# Patient Record
Sex: FEMALE | Race: OTHER | Employment: UNEMPLOYED | URBAN - METROPOLITAN AREA
[De-identification: names, ages, dates, MRNs, and addresses within clinical notes are randomized per-mention and may not be internally consistent; named-entity substitution may affect disease eponyms.]

---

## 2020-02-28 LAB — AMB EXT HGBA1C: 5.2 %

## 2020-05-26 LAB — AMB EXT CHLAMYDIA TRACHOMATIS ANTIGEN: NEGATIVE

## 2020-11-30 ENCOUNTER — OFFICE VISIT (OUTPATIENT)
Dept: FAMILY MEDICINE CLINIC | Facility: CLINIC | Age: 30
End: 2020-11-30
Payer: COMMERCIAL

## 2020-11-30 VITALS
TEMPERATURE: 98 F | WEIGHT: 205.81 LBS | HEIGHT: 66 IN | RESPIRATION RATE: 18 BRPM | DIASTOLIC BLOOD PRESSURE: 72 MMHG | SYSTOLIC BLOOD PRESSURE: 116 MMHG | HEART RATE: 96 BPM | OXYGEN SATURATION: 96 % | BODY MASS INDEX: 33.07 KG/M2

## 2020-11-30 DIAGNOSIS — Z13.6 SCREENING, ISCHEMIC HEART DISEASE: ICD-10-CM

## 2020-11-30 DIAGNOSIS — Z00.00 ANNUAL PHYSICAL EXAM: Primary | ICD-10-CM

## 2020-11-30 DIAGNOSIS — Z13.21 SCREENING FOR ENDOCRINE, NUTRITIONAL, METABOLIC AND IMMUNITY DISORDER: ICD-10-CM

## 2020-11-30 DIAGNOSIS — Z23 NEED FOR VACCINATION: ICD-10-CM

## 2020-11-30 DIAGNOSIS — R63.5 WEIGHT GAIN: ICD-10-CM

## 2020-11-30 DIAGNOSIS — Z13.228 SCREENING FOR ENDOCRINE, NUTRITIONAL, METABOLIC AND IMMUNITY DISORDER: ICD-10-CM

## 2020-11-30 DIAGNOSIS — Z13.29 SCREENING FOR ENDOCRINE, NUTRITIONAL, METABOLIC AND IMMUNITY DISORDER: ICD-10-CM

## 2020-11-30 DIAGNOSIS — Z13.0 SCREENING FOR ENDOCRINE, NUTRITIONAL, METABOLIC AND IMMUNITY DISORDER: ICD-10-CM

## 2020-11-30 DIAGNOSIS — F43.21 GRIEVING: ICD-10-CM

## 2020-11-30 PROCEDURE — 80061 LIPID PANEL: CPT | Performed by: FAMILY MEDICINE

## 2020-11-30 PROCEDURE — 83036 HEMOGLOBIN GLYCOSYLATED A1C: CPT | Performed by: FAMILY MEDICINE

## 2020-11-30 PROCEDURE — 3074F SYST BP LT 130 MM HG: CPT | Performed by: FAMILY MEDICINE

## 2020-11-30 PROCEDURE — 90715 TDAP VACCINE 7 YRS/> IM: CPT | Performed by: FAMILY MEDICINE

## 2020-11-30 PROCEDURE — 99385 PREV VISIT NEW AGE 18-39: CPT | Performed by: FAMILY MEDICINE

## 2020-11-30 PROCEDURE — 80050 GENERAL HEALTH PANEL: CPT | Performed by: FAMILY MEDICINE

## 2020-11-30 PROCEDURE — 3078F DIAST BP <80 MM HG: CPT | Performed by: FAMILY MEDICINE

## 2020-11-30 PROCEDURE — 36415 COLL VENOUS BLD VENIPUNCTURE: CPT | Performed by: FAMILY MEDICINE

## 2020-11-30 PROCEDURE — 90471 IMMUNIZATION ADMIN: CPT | Performed by: FAMILY MEDICINE

## 2020-11-30 PROCEDURE — 3008F BODY MASS INDEX DOCD: CPT | Performed by: FAMILY MEDICINE

## 2020-11-30 RX ORDER — FLUTICASONE PROPIONATE 50 MCG
SPRAY, SUSPENSION (ML) NASAL
COMMUNITY
Start: 2020-11-21 | End: 2021-02-15

## 2020-11-30 RX ORDER — CETIRIZINE HYDROCHLORIDE 10 MG/1
10 TABLET ORAL DAILY
COMMUNITY

## 2020-11-30 RX ORDER — ALPRAZOLAM 0.25 MG/1
0.25 TABLET ORAL NIGHTLY PRN
COMMUNITY
End: 2021-01-11 | Stop reason: ALTCHOICE

## 2020-11-30 RX ORDER — AZELASTINE 1 MG/ML
SPRAY, METERED NASAL
COMMUNITY
Start: 2020-11-21 | End: 2021-02-15

## 2020-11-30 NOTE — PROGRESS NOTES
REASON FOR VISIT:    Patricia Mooney is a 26year old female who presents for an Annual Health Assessment./establish care. Grieving loss of 12 week pregnancy 6/2020 -underwent D and C. Conceived naturally.  gained weight, has days not continuous feels zuleika index is 33.22 kg/m².     No results found for: GLUCOSE  Lab Results   Component Value Date    CHOLEST 179 11/30/2020     Lab Results   Component Value Date    HDL 66 (H) 11/30/2020     No results found for: TRIGLY  Lab Results   Component Value Date    LDL 30-65 Pap Smear,3 Years due on 03/04/2019    Chlamydia Screening Screen Annually age<25, if sex active/on OCPs; >24 high risk No results found for: CHLAMYDIA    Colonoscopy Screen Every 10 years There are no preventive care reminders to display for this pa 11/30/2020 5.2    No flowsheet data found. Creat/alb ratio  Annually Creatinine (mg/dL)   Date Value   11/30/2020 0.89        LDL  Annually LDL Cholesterol (mg/dL)   Date Value   11/30/2020 101 (H)    No flowsheet data found.      Dilated Eye exam  Torito Alexander vision  HEENT: denies nasal congestion, sinus pain or ST  LUNGS: denies shortness of breath with exertion  CARDIOVASCULAR: denies chest pain on exertion  GI: denies abdominal pain, denies heartburn  : denies dysuria, vaginal discharge or itching, periods EXTERNAL  - CBC WITH DIFFERENTIAL WITH PLATELET  - COMP METABOLIC PANEL (14)  - LIPID PANEL  - TSH W REFLEX TO FREE T4  BMI 33, goal BMI 25  -Encourage healthy diet of whole food and avoid processed food and sugary drinks and sodas.   Diet should include le indicates understanding of these issues and agrees to the plan. Return in about 1 month (around 12/30/2020) for discuss labs, if needed.     Alcohol cessation couseling performed  Diet counseling perfomed    SUGGESTED VACCINATIONS - Influenza, Pneumococcal

## 2020-11-30 NOTE — PROGRESS NOTES
Patient signed HIPAA medical records authorization form for the Facility identified below to disclose patient health information to Mckinley Marroquin / Provider Name: [facility]  Facility Address: [address] [place] [address]

## 2020-11-30 NOTE — PROGRESS NOTES
Patient came in for draw of ordered fasting labs. Patient drawn out of right AC, x 1 attempt and tolerated well. 1 lt grn and 1 lav tube drawn.

## 2020-11-30 NOTE — PATIENT INSTRUCTIONS
As of October 6th 2014, the Drug Enforcement Agency [facility]) is reclassifying all hydrocodone combination medications from Schedule III to Schedule II. This includes medications such as Norco, Vicodin, Lortab, Zohydro, and Vicoprofen.    What this means for yo Basel    [address]: [phone]  Dr. Ela Griffiths  [facility]- Office: [phone]   Dr. Chantal Cortes. [facility]    Dr. Misti Stewart MD  [phone]    Dr. Mireille Aguirre MD  1 your heart. If you’re thinking about exercise, you’re on the right track. You don’t need to become an athlete, but you do need a certain amount of brisk exercise to help strengthen your heart.  If you have been diagnosed with a heart condition, your doctor doctor if you:  · Have chest pain or feel dizzy or lightheaded  · Feel burning, tightness, pressure, or heaviness in your chest, neck, shoulders, back, or arms  · Have unusual shortness of breath  · Have increased joint or muscle pain  · Have palpitations eating fewer processed foods are two great ways to decrease the amount of salt you consume. · Managing calories. A calorie is a unit of energy.  Your body burns calories for fuel, but if you eat more calories than your body burns, the extras are stored as if you plan to eat two servings, double all the numbers on the label. Prepare food right  A key part of healthy cooking is cutting down on added fat and salt. Look on the internet for lower-fat, lower-sodium recipes.  Also, try these tips:  · Remove fat fr a woman stops having monthly periods. After menopause, the body makes less estrogen (female hormone). This increases bone loss. At this point, treatment may be needed to reduce the risk for fracture.  Exercise and calcium can also help keep your bones stron with calcium sulfate   204 mg/3 oz. Low-fat milk   297 mg/1 cup   Soybeans, fresh, boiled   131 mg/1/2 cup   Collards   179 mg/1/2 cup   Swiss cheese   272 mg/1 oz.    White beans, cooked   81 mg/1/2 cup   English muffin, whole wheat   175 mg/1 muffin   C provider wants to check your vitamin D levels to find out if you have any risks to bone health.  These might be:  · Low calcium  · Soft bones caused by low vitamin D or problems using it (osteomalacia)  · Osteopenia  · Osteoporosis  · Rickets, in children used to draw blood from a vein in your arm or hand. Does this test pose any risks? Having a blood test with a needle carries some risks. These include bleeding, infection, bruising, and feeling lightheaded.  When the needle pricks your arm or hand, you m medicines, such as cortisone, increase bone loss. They also decrease bone growth. Ask your healthcare provider about any side effects of your medicines, and how to prevent them. · Protein-rich or salty foods.  Eaten in large amounts, these foods may deplet 5/1/2018  © 2068-9649 [employer]. [address] [facility], [facility] [place]. All rights reserved. This information is not intended as a substitute for professional medical care. Always follow your healthcare professional's instructions.

## 2020-12-01 NOTE — PROGRESS NOTES
Emilee notified:  Dear Sudheer Leon,  Your labs are normal except your lipid panel is elevated.   Treatment for mild to moderate abnormal cholesterol and lipids is best managed  initially with lifestyle changes, improving diet and increasing physical act

## 2020-12-02 ENCOUNTER — MED REC SCAN ONLY (OUTPATIENT)
Dept: FAMILY MEDICINE CLINIC | Facility: CLINIC | Age: 30
End: 2020-12-02

## 2020-12-07 PROBLEM — Z86.19 HISTORY OF COLD SORES: Status: ACTIVE | Noted: 2020-12-07

## 2020-12-07 RX ORDER — FLUCONAZOLE 100 MG/1
TABLET ORAL
COMMUNITY

## 2021-01-11 ENCOUNTER — TELEPHONE (OUTPATIENT)
Dept: OBGYN CLINIC | Facility: CLINIC | Age: 31
End: 2021-01-11

## 2021-01-11 ENCOUNTER — OFFICE VISIT (OUTPATIENT)
Dept: OBGYN CLINIC | Facility: CLINIC | Age: 31
End: 2021-01-11
Payer: COMMERCIAL

## 2021-01-11 VITALS
RESPIRATION RATE: 16 BRPM | DIASTOLIC BLOOD PRESSURE: 70 MMHG | BODY MASS INDEX: 35.49 KG/M2 | HEART RATE: 94 BPM | SYSTOLIC BLOOD PRESSURE: 120 MMHG | WEIGHT: 213 LBS | HEIGHT: 65 IN

## 2021-01-11 DIAGNOSIS — Z12.4 SCREENING FOR CERVICAL CANCER: ICD-10-CM

## 2021-01-11 DIAGNOSIS — Z31.69 ENCOUNTER FOR PRECONCEPTION CONSULTATION: ICD-10-CM

## 2021-01-11 DIAGNOSIS — F43.21 GRIEVING: ICD-10-CM

## 2021-01-11 DIAGNOSIS — Z13.71 SCREENING FOR GENETIC DISEASE CARRIER STATUS: ICD-10-CM

## 2021-01-11 DIAGNOSIS — Z01.419 WELL WOMAN EXAM WITH ROUTINE GYNECOLOGICAL EXAM: Primary | ICD-10-CM

## 2021-01-11 DIAGNOSIS — F43.9 STRESS: ICD-10-CM

## 2021-01-11 DIAGNOSIS — E66.9 OBESITY (BMI 30-39.9): ICD-10-CM

## 2021-01-11 DIAGNOSIS — Z87.59 HISTORY OF MISCARRIAGE: ICD-10-CM

## 2021-01-11 DIAGNOSIS — N97.9 FEMALE INFERTILITY: ICD-10-CM

## 2021-01-11 PROCEDURE — 3074F SYST BP LT 130 MM HG: CPT | Performed by: OBSTETRICS & GYNECOLOGY

## 2021-01-11 PROCEDURE — 99203 OFFICE O/P NEW LOW 30 MIN: CPT | Performed by: OBSTETRICS & GYNECOLOGY

## 2021-01-11 PROCEDURE — 3008F BODY MASS INDEX DOCD: CPT | Performed by: OBSTETRICS & GYNECOLOGY

## 2021-01-11 PROCEDURE — 3078F DIAST BP <80 MM HG: CPT | Performed by: OBSTETRICS & GYNECOLOGY

## 2021-01-11 PROCEDURE — 99385 PREV VISIT NEW AGE 18-39: CPT | Performed by: OBSTETRICS & GYNECOLOGY

## 2021-01-11 NOTE — H&P
[facility]  Obstetrics and Gynecology   History & Physical  New    Chief complaint: Patient presents with:  Physical: here for annual Physical      Subjective:     HPI: Yani Cortes is a 26year old  with LMP Patient's last menstrual per dysmenorrhea. Is sexually active. No dyspareunia. Contraception: none currently   STDs: no   HPV vaccine - received     Pap & HPV negative 1/29/2019 in [place] - has records scanned into chart.      Patient reports no h/o abnormal pap smear but he file prior to visit.        All:    Bee Venom               ANAPHYLAXIS    PMH:  Past Medical History:   Diagnosis Date   • Allergic rhinitis    • Anxiety 11/2020   • COVID-19 03/2020   • Depression 11/2020   • History of cold sores 12/07/2020   • Guy Monthly or less        Drinks per session: 1 or 2        Binge frequency: Less than monthly      Drug use: Never      Sexual activity: Yes        Partners: Male        Birth control/protection: Condom    Lifestyle      Physical activity        Days per wee Height: 65\"       Body mass index is 35.45 kg/m². Physical Exam:  Physical Exam   Nursing note and vitals reviewed. Constitutional: She is oriented to person, place, and time and obese. She appears well-developed and well-nourished. No distress. RDW-SD      35.1 - 46.3 fL  41.3   Prelim Neutrophil Abs      1.50 - 7.70 x10 (3) uL  4.78   Neutrophils Absolute      1.50 - 7.70 x10(3) uL  4.78   Lymphocytes Absolute      1.00 - 4.00 x10(3) uL  3.12   Monocytes Absolute      0.10 - 1.00 x10(3) uL  0. miscarriage, infertility - sounding mainly male factor. Has had a very difficult year emotionally.      Diagnoses and all orders for this visit:    Well woman exam with routine gynecological exam    Screening for cervical cancer    History of miscarriage

## 2021-01-22 ENCOUNTER — HOSPITAL ENCOUNTER (OUTPATIENT)
Dept: GENERAL RADIOLOGY | Facility: HOSPITAL | Age: 31
Discharge: HOME | End: 2021-01-22
Attending: OBSTETRICS & GYNECOLOGY
Payer: COMMERCIAL

## 2021-01-22 DIAGNOSIS — N97.9 FEMALE INFERTILITY: ICD-10-CM

## 2021-01-22 PROCEDURE — 58340 CATHETER FOR HYSTEROGRAPHY: CPT | Performed by: OBSTETRICS & GYNECOLOGY

## 2021-01-22 PROCEDURE — 74740 X-RAY FEMALE GENITAL TRACT: CPT | Performed by: OBSTETRICS & GYNECOLOGY

## 2021-02-13 ENCOUNTER — PATIENT MESSAGE (OUTPATIENT)
Dept: FAMILY MEDICINE CLINIC | Facility: CLINIC | Age: 31
End: 2021-02-13

## 2021-02-15 RX ORDER — FLUTICASONE PROPIONATE 50 MCG
SPRAY, SUSPENSION (ML) NASAL
Qty: 3 BOTTLE | Refills: 2 | Status: SHIPPED | OUTPATIENT
Start: 2021-02-15 | End: 2021-02-19

## 2021-02-15 RX ORDER — AZELASTINE 1 MG/ML
SPRAY, METERED NASAL
Qty: 3 BOTTLE | Refills: 2 | Status: SHIPPED | OUTPATIENT
Start: 2021-02-15 | End: 2021-02-19

## 2021-02-15 NOTE — TELEPHONE ENCOUNTER
From: Patricia Mooney  To: Jeremiah Mae DO  Sent: 2/13/2021 4:16 PM CST  Subject: Prescription Question    Hello,    Could you please call in my prescriptions for     1. Azelastine HCl 0.1 % Soln and   2. Fluticasone Propionate 50 MCG/ACT Susp    I thought

## 2021-02-15 NOTE — TELEPHONE ENCOUNTER
Pt requesting refill of   Requested Prescriptions     Signed Prescriptions Disp Refills   • Azelastine HCl 0.1 % Nasal Solution 3 Bottle 2     Sig: USE 1 SPRAY each nostril BID.      Authorizing Provider: Su Verduzco     Ordering User: Williams Pérez

## 2021-02-18 ENCOUNTER — TELEPHONE (OUTPATIENT)
Dept: OBGYN CLINIC | Facility: CLINIC | Age: 31
End: 2021-02-18

## 2021-02-18 NOTE — TELEPHONE ENCOUNTER
Pt wants to dispute billing code for dos>01/11/21. per pt it should coded as preventive only!  Pt stats other issues was discuss @ the visit but as a new gyne she does not think she she be charge for it.pt did discuss w/ the billing dept & they will contact

## 2021-02-19 ENCOUNTER — TELEPHONE (OUTPATIENT)
Dept: OBGYN CLINIC | Facility: CLINIC | Age: 31
End: 2021-02-19

## 2021-02-22 RX ORDER — FLUTICASONE PROPIONATE 50 MCG
SPRAY, SUSPENSION (ML) NASAL
Qty: 3 BOTTLE | Refills: 2 | Status: SHIPPED | OUTPATIENT
Start: 2021-02-22

## 2021-02-22 RX ORDER — AZELASTINE 1 MG/ML
SPRAY, METERED NASAL
Qty: 3 BOTTLE | Refills: 2 | Status: SHIPPED | OUTPATIENT
Start: 2021-02-22

## 2021-02-22 NOTE — TELEPHONE ENCOUNTER
Pt requesting refill of   Requested Prescriptions     Pending Prescriptions Disp Refills   • Azelastine HCl 0.1 % Nasal Solution 3 Bottle 2     Sig: USE 1 SPRAY each nostril BID.    • Fluticasone Propionate 50 MCG/ACT Nasal Suspension 3 Bottle 2     Sig: IN

## 2021-02-24 ENCOUNTER — TELEPHONE (OUTPATIENT)
Dept: FAMILY MEDICINE CLINIC | Facility: CLINIC | Age: 31
End: 2021-02-24

## 2021-02-24 DIAGNOSIS — Z20.822 CLOSE EXPOSURE TO COVID-19 VIRUS: Primary | ICD-10-CM

## 2021-02-24 NOTE — TELEPHONE ENCOUNTER
Patient exposed with  yesterday to COVID-19 has no symptoms. Requesting testing. Patient was with  on his video call appointment today. Patient will quarantine at home for the next 2 weeks and contact office if symptoms occur.   Recommend

## 2021-03-02 ENCOUNTER — PATIENT MESSAGE (OUTPATIENT)
Dept: FAMILY MEDICINE CLINIC | Facility: CLINIC | Age: 31
End: 2021-03-02

## 2021-03-03 NOTE — TELEPHONE ENCOUNTER
From: Unknown Golas  To: Jean MezaDO  Sent: 3/2/2021 7:43 PM CST  Subject: Non-Urgent Faisal Wlils,    I have a quick question for you regarding an injury I was wondering if I should be seen for.  In October when we moved he

## 2021-04-07 ENCOUNTER — LABORATORY ENCOUNTER (OUTPATIENT)
Dept: LAB | Age: 31
End: 2021-04-07
Attending: OBSTETRICS & GYNECOLOGY
Payer: COMMERCIAL

## 2021-04-07 DIAGNOSIS — E66.9 OBESITY (BMI 30-39.9): ICD-10-CM

## 2021-04-07 DIAGNOSIS — R63.5 WEIGHT GAIN: ICD-10-CM

## 2021-04-07 DIAGNOSIS — N97.9 FEMALE INFERTILITY: ICD-10-CM

## 2021-04-07 PROCEDURE — 36415 COLL VENOUS BLD VENIPUNCTURE: CPT | Performed by: OBSTETRICS & GYNECOLOGY

## 2021-04-07 PROCEDURE — 83520 IMMUNOASSAY QUANT NOS NONAB: CPT | Performed by: OBSTETRICS & GYNECOLOGY

## 2021-04-07 PROCEDURE — 82306 VITAMIN D 25 HYDROXY: CPT | Performed by: OBSTETRICS & GYNECOLOGY

## 2021-04-07 PROCEDURE — 80050 GENERAL HEALTH PANEL: CPT | Performed by: OBSTETRICS & GYNECOLOGY

## 2021-04-07 PROCEDURE — 80061 LIPID PANEL: CPT | Performed by: OBSTETRICS & GYNECOLOGY

## 2021-04-07 PROCEDURE — 84146 ASSAY OF PROLACTIN: CPT | Performed by: OBSTETRICS & GYNECOLOGY

## 2021-04-07 PROCEDURE — 82627 DEHYDROEPIANDROSTERONE: CPT | Performed by: OBSTETRICS & GYNECOLOGY

## 2021-04-07 PROCEDURE — 84403 ASSAY OF TOTAL TESTOSTERONE: CPT | Performed by: OBSTETRICS & GYNECOLOGY

## 2021-04-07 PROCEDURE — 84144 ASSAY OF PROGESTERONE: CPT | Performed by: OBSTETRICS & GYNECOLOGY

## 2021-04-07 PROCEDURE — 83001 ASSAY OF GONADOTROPIN (FSH): CPT | Performed by: OBSTETRICS & GYNECOLOGY

## 2021-04-07 PROCEDURE — 82670 ASSAY OF TOTAL ESTRADIOL: CPT | Performed by: OBSTETRICS & GYNECOLOGY

## 2021-04-07 PROCEDURE — 83036 HEMOGLOBIN GLYCOSYLATED A1C: CPT | Performed by: OBSTETRICS & GYNECOLOGY

## 2021-04-07 PROCEDURE — 84402 ASSAY OF FREE TESTOSTERONE: CPT | Performed by: OBSTETRICS & GYNECOLOGY

## 2021-04-23 ENCOUNTER — TELEMEDICINE (OUTPATIENT)
Dept: INTERNAL MEDICINE CLINIC | Facility: CLINIC | Age: 31
End: 2021-04-23

## 2021-04-23 ENCOUNTER — PATIENT MESSAGE (OUTPATIENT)
Dept: INTERNAL MEDICINE CLINIC | Facility: CLINIC | Age: 31
End: 2021-04-23

## 2021-04-23 VITALS — WEIGHT: 203 LBS | BODY MASS INDEX: 34 KG/M2

## 2021-04-23 DIAGNOSIS — F41.9 ANXIETY AND DEPRESSION: ICD-10-CM

## 2021-04-23 DIAGNOSIS — F32.A ANXIETY AND DEPRESSION: ICD-10-CM

## 2021-04-23 DIAGNOSIS — F43.21 GRIEVING: ICD-10-CM

## 2021-04-23 DIAGNOSIS — Z51.81 THERAPEUTIC DRUG MONITORING: Primary | ICD-10-CM

## 2021-04-23 DIAGNOSIS — E66.9 OBESITY (BMI 30-39.9): ICD-10-CM

## 2021-04-23 PROCEDURE — 99204 OFFICE O/P NEW MOD 45 MIN: CPT | Performed by: NURSE PRACTITIONER

## 2021-04-23 RX ORDER — BUPROPION HYDROCHLORIDE 150 MG/1
150 TABLET ORAL DAILY
Qty: 30 TABLET | Refills: 1 | Status: SHIPPED | OUTPATIENT
Start: 2021-04-23 | End: 2021-05-17

## 2021-04-23 NOTE — PATIENT INSTRUCTIONS
We are here to support you with weight loss, but please remember that you still need your primary care provider for your routine health maintenance.       PLAN:  Start taking wellbutrin 1 tablet daily   Go to the lab for blood work   Follow up with me in 1 day)                   ** Daily INPUT> Look at nutrition section-- \"nutrients\" and it will break down your macros for the day (ie. Protein, carbs, fibers, sugars and fats). Try to stay within these numbers daily     2.  \"7 minute workout\" to help with e

## 2021-04-23 NOTE — PROGRESS NOTES
[facility] WEIGHT MANAGEMENT VIRTUAL VIDEO ENCOUNTER     Brennen Randall verbally consents to a Virtual/Telephone Check-In service on 04/23/21  Patient understands and accepts financial responsibility for any deductible, co-insurance and/or co- (92.1 kg)  01/11/21 : 213 lb (96.6 kg)  11/30/20 : 205 lb 12.8 oz (93.4 kg)       Typical diet   Breakfast Lunch Dinner Snacks Fluids   Atkins shake 8am   4oz skirt steak brocoli 1pm   Ceasear salad with chicken 6pm    Water: adequate   Soda:  Juice:  Coff striae  PSYCH: denies change in behavior or mood, hx of anxiety/depression (this past year)    EXAM  Wt 203 lb (92.1 kg)   LMP 12/18/2020   BMI 33.78 kg/m² , Percent body fat: unable to complete (will perform in office)   Reviewed recent set of vitals symptoms. , Disp: 4 tablet, Rfl: 5  cetirizine 10 MG Oral Tab, Take 10 mg by mouth daily. , Disp: , Rfl:     No current facility-administered medications on file prior to visit.       ASSESSMENT/PLAN  (Z51.81) Therapeutic drug monitoring  (primary encounter d provide continuity of care in the best interest of the provider-patient relationship, due to the ongoing public health crisis/national emergency and because of restrictions of visitation.   There are limitations of this visit as no physical exam could be pe

## 2021-04-23 NOTE — TELEPHONE ENCOUNTER
From: Rebekah Crawford  To: SAVITA Dsouza  Sent: 4/23/2021 10:38 AM CDT  Subject: Visit Follow-up Question    Hello,    Thanks again for speaking with me this morning.  I was wondering if it's possible to just keep my appointment on 5/4 to s

## 2021-05-08 ENCOUNTER — PATIENT MESSAGE (OUTPATIENT)
Dept: INTERNAL MEDICINE CLINIC | Facility: CLINIC | Age: 31
End: 2021-05-08

## 2021-05-09 NOTE — TELEPHONE ENCOUNTER
From: John Crawford  To: SAVITA Dimas  Sent: 5/8/2021 7:58 AM CDT  Subject: Visit Follow-up Question    I've read so many thing saying wellbutrin affects your menstrual cycle and makes your period late.  If that is true, I am really upse

## 2021-05-10 ENCOUNTER — TELEPHONE (OUTPATIENT)
Dept: INTERNAL MEDICINE CLINIC | Facility: CLINIC | Age: 31
End: 2021-05-10

## 2021-05-10 ENCOUNTER — PATIENT MESSAGE (OUTPATIENT)
Dept: INTERNAL MEDICINE CLINIC | Facility: CLINIC | Age: 31
End: 2021-05-10

## 2021-05-10 NOTE — TELEPHONE ENCOUNTER
This provider called patient to discuss wellbutrin medication. Is angry about getting started on this medication. She stopped taking on 5/7/21 (took for about 20 days).    Is 7 days late on her menstrual cycle and goggled the medication, and saw that it c

## 2021-05-11 NOTE — TELEPHONE ENCOUNTER
From: Karina Crawford  To: SAVITA Meneses  Sent: 5/10/2021 6:03 PM CDT  Subject: Visit Follow-up Question    Hello,    Thanks again for calling today to discuss the medication. My period came today as well.  So I do think the medication caus

## 2021-05-12 RX ORDER — METFORMIN HYDROCHLORIDE 500 MG/1
500 TABLET, EXTENDED RELEASE ORAL 2 TIMES DAILY WITH MEALS
Qty: 60 TABLET | Refills: 1 | Status: SHIPPED | OUTPATIENT
Start: 2021-05-12 | End: 2021-06-21

## 2021-05-12 NOTE — TELEPHONE ENCOUNTER
I verified with Fatmata Cullen that she wants patient on Metformin  mg but at bid not every day as she noted. She is to get #60 with a refill. I called patient to discuss and had to leave a message to call me back.

## 2021-05-12 NOTE — TELEPHONE ENCOUNTER
So yes, we can try metformin 500mg ER (quant #30 and 1 refill)   Please explain to her how and when to take   thanks

## 2021-05-12 NOTE — TELEPHONE ENCOUNTER
Patient returned my call and we discussed the medication and possible GI problems. She will let us know if any are severe. Will take twice daily with meals.   RX sent to [facility]

## 2021-05-18 ENCOUNTER — PATIENT MESSAGE (OUTPATIENT)
Dept: INTERNAL MEDICINE CLINIC | Facility: CLINIC | Age: 31
End: 2021-05-18

## 2021-05-18 ENCOUNTER — OFFICE VISIT (OUTPATIENT)
Dept: INTERNAL MEDICINE CLINIC | Facility: CLINIC | Age: 31
End: 2021-05-18
Payer: COMMERCIAL

## 2021-05-18 VITALS
DIASTOLIC BLOOD PRESSURE: 76 MMHG | RESPIRATION RATE: 18 BRPM | WEIGHT: 210 LBS | HEIGHT: 65.5 IN | HEART RATE: 88 BPM | SYSTOLIC BLOOD PRESSURE: 122 MMHG | BODY MASS INDEX: 34.57 KG/M2

## 2021-05-18 DIAGNOSIS — E66.9 OBESITY (BMI 30-39.9): ICD-10-CM

## 2021-05-18 DIAGNOSIS — Z51.81 THERAPEUTIC DRUG MONITORING: ICD-10-CM

## 2021-05-18 DIAGNOSIS — F32.A ANXIETY AND DEPRESSION: ICD-10-CM

## 2021-05-18 DIAGNOSIS — F41.9 ANXIETY AND DEPRESSION: ICD-10-CM

## 2021-05-18 DIAGNOSIS — Z51.81 THERAPEUTIC DRUG MONITORING: Primary | ICD-10-CM

## 2021-05-18 PROCEDURE — 93000 ELECTROCARDIOGRAM COMPLETE: CPT | Performed by: NURSE PRACTITIONER

## 2021-05-18 PROCEDURE — 3078F DIAST BP <80 MM HG: CPT | Performed by: NURSE PRACTITIONER

## 2021-05-18 PROCEDURE — 99214 OFFICE O/P EST MOD 30 MIN: CPT | Performed by: NURSE PRACTITIONER

## 2021-05-18 PROCEDURE — 3008F BODY MASS INDEX DOCD: CPT | Performed by: NURSE PRACTITIONER

## 2021-05-18 PROCEDURE — 3074F SYST BP LT 130 MM HG: CPT | Performed by: NURSE PRACTITIONER

## 2021-05-18 RX ORDER — DIETHYLPROPION HYDROCHLORIDE 75 MG/1
1 TABLET ORAL EVERY MORNING
Qty: 30 TABLET | Refills: 0 | Status: SHIPPED | OUTPATIENT
Start: 2021-05-18 | End: 2021-05-19

## 2021-05-18 NOTE — PROGRESS NOTES
HISTORY OF PRESENT ILLNESS  Patient presents with:  Weight Check: starting weight 210    Pao Ngo is a 33year old female here for follow up with medical weight loss program for the treatment of overweight, obesity, or morbid obesity.      Steffany in behavior or mood, hx of anxiety and depression    EXAM  /76   Pulse 88   Resp 18   Ht 5' 5.5\" (1.664 m)   Wt 210 lb (95.3 kg)   LMP 05/10/2021 (Exact Date)   Breastfeeding No   BMI 34.41 kg/m²   Waist Circumference: 43.5   GENERAL: well developed Tab, Take 2 tablets (2,000 mg total) by mouth every 12 (twelve) hours. For 1 day at onset of symptoms. , Disp: 4 tablet, Rfl: 5  cetirizine 10 MG Oral Tab, Take 10 mg by mouth daily. , Disp: , Rfl:     No current facility-administered medications on file willie more details. · Discussed strategies to overcome barriers to successful weight loss and weight maintenance  · FITTE: ACSM recommendations (150-300 minutes/ week in active weight loss)   · Weight Loss Consent to treat reviewed and signed.     Total time spe help you to monitor daily dietary intake and you will be able to see if you are eating the right amount of calories, protein, carbs                With My Fitness Pal-->When you set-up the kitty or need to adjust settings:                Goals should include

## 2021-05-18 NOTE — PATIENT INSTRUCTIONS
We are here to support you with weight loss, but please remember that you still need your primary care provider for your routine health maintenance.       PLAN:  Will trial diethylopropion 75mg daily   Go to the lab for blood work   Follow up with me in 1 m day)                   ** Daily INPUT> Look at nutrition section-- \"nutrients\" and it will break down your macros for the day (ie. Protein, carbs, fibers, sugars and fats). Try to stay within these numbers daily     2.  \"7 minute workout\" to help with e

## 2021-05-19 RX ORDER — DIETHYLPROPION HYDROCHLORIDE 75 MG/1
1 TABLET ORAL EVERY MORNING
Qty: 30 TABLET | Refills: 0 | Status: SHIPPED | OUTPATIENT
Start: 2021-05-19 | End: 2021-06-03

## 2021-05-19 NOTE — TELEPHONE ENCOUNTER
From: Karina Crawford  To: SAVITA Meneses  Sent: 5/18/2021 4:42 PM CDT  Subject: Prescription Question    Sathish Goodman does not have the medication and cannot transfer it to [facility] it is the first time it's being filled.  Henry Plummer

## 2021-05-19 NOTE — TELEPHONE ENCOUNTER
Left message at [facility] since they are still closed when I called to cancel order sent there yesterday for diethylpropion since we resent to [facility] today.

## 2021-05-21 ENCOUNTER — PATIENT MESSAGE (OUTPATIENT)
Dept: INTERNAL MEDICINE CLINIC | Facility: CLINIC | Age: 31
End: 2021-05-21

## 2021-05-21 NOTE — TELEPHONE ENCOUNTER
From: Isra Crawford  To: SAVITA Murray  Sent: 5/21/2021 9:24 AM CDT  Subject: Prescription Question    Hello,    I was wondering if the diethylpropion takes a few days to set in in terms of making you not hungry.  I started it on Wednesda

## 2021-06-01 NOTE — TELEPHONE ENCOUNTER
Future Appointments   Date Time Provider Rubina Francis   6/23/2021  9:00 AM SAVITA Strong [facility]   6/29/2021  9:00 AM Miles Velasco RD [facility]

## 2021-06-03 ENCOUNTER — TELEPHONE (OUTPATIENT)
Dept: INTERNAL MEDICINE CLINIC | Facility: CLINIC | Age: 31
End: 2021-06-03

## 2021-06-03 ENCOUNTER — PATIENT MESSAGE (OUTPATIENT)
Dept: INTERNAL MEDICINE CLINIC | Facility: CLINIC | Age: 31
End: 2021-06-03

## 2021-06-03 DIAGNOSIS — E66.9 OBESITY (BMI 30-39.9): ICD-10-CM

## 2021-06-03 DIAGNOSIS — Z51.81 THERAPEUTIC DRUG MONITORING: Primary | ICD-10-CM

## 2021-06-03 RX ORDER — PHENDIMETRAZINE TARTRATE 105 MG/1
1 CAPSULE, EXTENDED RELEASE ORAL EVERY MORNING
Qty: 30 CAPSULE | Refills: 0 | Status: SHIPPED | OUTPATIENT
Start: 2021-06-03 | End: 2021-06-21

## 2021-06-03 NOTE — TELEPHONE ENCOUNTER
PA requested in [facility]'S [facility] for Phendimetrazine 105 mg  KEY [id number]  Entered and received the following message: This medication may be excluded from the patient's benefit. For more information, please reach out to [facility] directly at [phone].   Pt

## 2021-06-03 NOTE — TELEPHONE ENCOUNTER
From: Prashant Crawford  To: SAVITA Martinez  Sent: 6/3/2021 8:56 AM CDT  Subject: Prescription Question    Good morning,    I take it everyday and I am still hungry. Most days the only difference I feel is dry mouth.  I haven't lost any weight

## 2021-06-14 ENCOUNTER — LABORATORY ENCOUNTER (OUTPATIENT)
Dept: LAB | Age: 31
End: 2021-06-14
Attending: OBSTETRICS & GYNECOLOGY
Payer: COMMERCIAL

## 2021-06-14 DIAGNOSIS — Z51.81 THERAPEUTIC DRUG MONITORING: ICD-10-CM

## 2021-06-14 DIAGNOSIS — E66.9 OBESITY (BMI 30-39.9): ICD-10-CM

## 2021-06-14 PROCEDURE — 82607 VITAMIN B-12: CPT | Performed by: NURSE PRACTITIONER

## 2021-06-15 NOTE — PROGRESS NOTES
Vitamin b12- came back a little high. Nothing that we are doing to do anything about. Are you taking an extra vitamin b12 supplement? Biotin?

## 2021-06-18 ENCOUNTER — PATIENT MESSAGE (OUTPATIENT)
Dept: FAMILY MEDICINE CLINIC | Facility: CLINIC | Age: 31
End: 2021-06-18

## 2021-06-21 RX ORDER — BUPROPION HYDROCHLORIDE 150 MG/1
150 TABLET ORAL DAILY
COMMUNITY
Start: 2021-05-18 | End: 2021-06-29

## 2021-06-21 RX ORDER — EPINEPHRINE 0.3 MG/.3ML
0.3 INJECTION SUBCUTANEOUS ONCE
Qty: 1 EACH | Refills: 0 | Status: SHIPPED | OUTPATIENT
Start: 2021-06-21 | End: 2021-06-29

## 2021-06-21 NOTE — TELEPHONE ENCOUNTER
From: Nataly Helm  To: Troy Escamilla DO  Sent: 6/18/2021 5:26 PM CDT  Subject: Prescription Question    Hello,    I was wondering if you could send in a prescription for an Epi pen.  Now that bees are out and about I'll need a new one as my one

## 2021-06-21 NOTE — TELEPHONE ENCOUNTER
Pt requesting refill of   Requested Prescriptions     Pending Prescriptions Disp Refills   • EPINEPHrine (EPIPEN 2-PRASHANTH) 0.3 MG/0.3ML Injection Solution Auto-injector 1 each 0     Sig: Inject 0.3 mL (1 each total) as directed one time for 1 dose.      No pro

## 2021-06-28 ENCOUNTER — PATIENT MESSAGE (OUTPATIENT)
Dept: FAMILY MEDICINE CLINIC | Facility: CLINIC | Age: 31
End: 2021-06-28

## 2021-06-29 DIAGNOSIS — Z51.81 THERAPEUTIC DRUG MONITORING: ICD-10-CM

## 2021-06-29 DIAGNOSIS — E66.9 OBESITY (BMI 30-39.9): Primary | ICD-10-CM

## 2021-06-29 RX ORDER — METFORMIN HYDROCHLORIDE 500 MG/1
TABLET, EXTENDED RELEASE ORAL
Qty: 60 TABLET | Refills: 0 | Status: SHIPPED | OUTPATIENT
Start: 2021-06-29

## 2021-06-29 NOTE — TELEPHONE ENCOUNTER
From: Bethel Puente  To: David Willard DO  Sent: 6/28/2021 5:11 PM CDT  Subject: Prescription Question    Hello,    Could you please fax the RX for my epi pen to [phone] and include the following     1. First and Last Name  2.  Shipping addre

## 2021-06-29 NOTE — TELEPHONE ENCOUNTER
Requesting Metformin 500 mg  LOV: 5/18/21  RTC: one month  Last Relevant Labs: 4/7/21  Filled: 5/12/21 #60 with 1 refills    No future appointments.

## 2021-06-29 NOTE — TELEPHONE ENCOUNTER
Pt requesting refill of   Requested Prescriptions     Pending Prescriptions Disp Refills   • EPINEPHrine (EPIPEN 2-PRASHANTH) 0.3 MG/0.3ML Injection Solution Auto-injector 1 each 0     Sig: Inject 0.3 mL (1 each total) as directed one time for 1 dose.         No

## 2021-06-30 RX ORDER — EPINEPHRINE 0.3 MG/.3ML
INJECTION SUBCUTANEOUS
Qty: 2 EACH | Refills: 1 | Status: SHIPPED | OUTPATIENT
Start: 2021-06-30

## 2021-11-23 ENCOUNTER — TELEPHONE (OUTPATIENT)
Dept: FAMILY MEDICINE CLINIC | Facility: CLINIC | Age: 31
End: 2021-11-23

## 2021-11-23 NOTE — TELEPHONE ENCOUNTER
Pt Carisa Massey called office requesting pt and herself to be removed from [facility]  patient list as patient will establish care with new PCP.

## 2022-05-25 RX ORDER — AZELASTINE 1 MG/ML
SPRAY, METERED NASAL
Qty: 90 ML | Refills: 0 | OUTPATIENT
Start: 2022-05-25

## 2022-05-25 RX ORDER — FLUTICASONE PROPIONATE 50 MCG
SPRAY, SUSPENSION (ML) NASAL
Qty: 48 G | Refills: 0 | OUTPATIENT
Start: 2022-05-25

## 2024-09-12 NOTE — TELEPHONE ENCOUNTER
Pharmacy is closed to try and call in med. Will you sign order please and send back to me so I can cancel the order you sent to [facility] [address] when they open? 12-Sep-2024 22:47

## (undated) NOTE — LETTER
Patient Name: Haley Waterman  : 3/4/1990  MRN: [id number]  Patient Address: [address]  [place] [id number]      Coronavirus Disease 2019 (COVID-19)     [facility] is committed to the safety and well-being of our patients, m 2. Monitor your symptoms carefully. If your symptoms get worse, call your healthcare provider immediately. 3. Get rest and stay hydrated.    4. If you have a medical appointment, call the healthcare provider ahead of time and tell them that you have or may ? At least 24 hours have passed since recovery defined as resolution of fever without the use of fever-reducing medications; and  · Improvement in respiratory symptoms (e.g., cough, shortness of breath); and  · At least 10 days have passed since symptoms f If you would be interested in donating your plasma to help treat others diagnosed with the virus, please contact [facility] directly on their website: [email or web address]    Who is eligible to donate convalescent plasma?

## (undated) NOTE — LETTER
[address] [facility], [address] [facility] [id number]            June 9, 2021      Judy Monzon [address]  [facility] 64 10[address] [facility]

## (undated) NOTE — Clinical Note
Dr. Raghu Song,  I had telemedicine visit with Ms. Oly Robbins today.  Thank you for the referral.  Sincerely, SAVITA Cam APRN, [facility] Obesity Medicine [address] [facility] Weight [facility] [address], [address]